# Patient Record
Sex: MALE | Race: OTHER
[De-identification: names, ages, dates, MRNs, and addresses within clinical notes are randomized per-mention and may not be internally consistent; named-entity substitution may affect disease eponyms.]

---

## 2020-07-22 ENCOUNTER — HOSPITAL ENCOUNTER (INPATIENT)
Dept: HOSPITAL 15 - ER | Age: 60
LOS: 6 days | Discharge: HOME | DRG: 871 | End: 2020-07-28
Attending: INTERNAL MEDICINE | Admitting: NURSE PRACTITIONER
Payer: MEDICARE

## 2020-07-22 VITALS — BODY MASS INDEX: 33.94 KG/M2 | HEIGHT: 65 IN | WEIGHT: 203.71 LBS

## 2020-07-22 DIAGNOSIS — N18.3: ICD-10-CM

## 2020-07-22 DIAGNOSIS — Z79.84: ICD-10-CM

## 2020-07-22 DIAGNOSIS — M77.9: ICD-10-CM

## 2020-07-22 DIAGNOSIS — K76.0: ICD-10-CM

## 2020-07-22 DIAGNOSIS — K80.20: ICD-10-CM

## 2020-07-22 DIAGNOSIS — K21.9: ICD-10-CM

## 2020-07-22 DIAGNOSIS — A04.9: ICD-10-CM

## 2020-07-22 DIAGNOSIS — E66.9: ICD-10-CM

## 2020-07-22 DIAGNOSIS — E87.1: ICD-10-CM

## 2020-07-22 DIAGNOSIS — K44.9: ICD-10-CM

## 2020-07-22 DIAGNOSIS — I21.A1: ICD-10-CM

## 2020-07-22 DIAGNOSIS — M48.10: ICD-10-CM

## 2020-07-22 DIAGNOSIS — E11.22: ICD-10-CM

## 2020-07-22 DIAGNOSIS — Z20.828: ICD-10-CM

## 2020-07-22 DIAGNOSIS — N28.1: ICD-10-CM

## 2020-07-22 DIAGNOSIS — Z93.1: ICD-10-CM

## 2020-07-22 DIAGNOSIS — A41.9: Primary | ICD-10-CM

## 2020-07-22 DIAGNOSIS — R19.7: ICD-10-CM

## 2020-07-22 DIAGNOSIS — N28.89: ICD-10-CM

## 2020-07-22 DIAGNOSIS — K29.70: ICD-10-CM

## 2020-07-22 DIAGNOSIS — E78.5: ICD-10-CM

## 2020-07-22 DIAGNOSIS — Z86.73: ICD-10-CM

## 2020-07-22 DIAGNOSIS — R65.20: ICD-10-CM

## 2020-07-22 DIAGNOSIS — N17.0: ICD-10-CM

## 2020-07-22 DIAGNOSIS — I12.9: ICD-10-CM

## 2020-07-22 LAB
ALBUMIN SERPL-MCNC: 3.9 G/DL (ref 3.4–5)
ALP SERPL-CCNC: 81 U/L (ref 45–117)
ALT SERPL-CCNC: 77 U/L (ref 16–61)
AMYLASE SERPL-CCNC: 124 U/L (ref 25–115)
ANION GAP SERPL CALCULATED.3IONS-SCNC: 16 MMOL/L (ref 5–15)
BILIRUB SERPL-MCNC: 1.6 MG/DL (ref 0.2–1)
BUN SERPL-MCNC: 15 MG/DL (ref 7–18)
BUN/CREAT SERPL: 8.8
CALCIUM SERPL-MCNC: 8.5 MG/DL (ref 8.5–10.1)
CHLORIDE SERPL-SCNC: 86 MMOL/L (ref 98–107)
CO2 SERPL-SCNC: 13 MMOL/L (ref 21–32)
GLUCOSE SERPL-MCNC: 124 MG/DL (ref 74–106)
HCT VFR BLD AUTO: 44.6 % (ref 41–53)
HGB BLD-MCNC: 15.2 G/DL (ref 13.5–17.5)
LACTATE PLASV-SCNC: 2.3 MMOL/L (ref 0.4–2)
LIPASE SERPL-CCNC: 212 U/L (ref 73–393)
MCH RBC QN AUTO: 29.9 PG (ref 28–32)
MCV RBC AUTO: 87.5 FL (ref 80–100)
NRBC BLD QL AUTO: 0 %
POTASSIUM SERPL-SCNC: 3.9 MMOL/L (ref 3.5–5.1)
PROT SERPL-MCNC: 8.3 G/DL (ref 6.4–8.2)
SODIUM SERPL-SCNC: 115 MMOL/L (ref 136–145)

## 2020-07-22 PROCEDURE — 82728 ASSAY OF FERRITIN: CPT

## 2020-07-22 PROCEDURE — 83970 ASSAY OF PARATHORMONE: CPT

## 2020-07-22 PROCEDURE — 80053 COMPREHEN METABOLIC PANEL: CPT

## 2020-07-22 PROCEDURE — 82306 VITAMIN D 25 HYDROXY: CPT

## 2020-07-22 PROCEDURE — 84484 ASSAY OF TROPONIN QUANT: CPT

## 2020-07-22 PROCEDURE — 81001 URINALYSIS AUTO W/SCOPE: CPT

## 2020-07-22 PROCEDURE — 76705 ECHO EXAM OF ABDOMEN: CPT

## 2020-07-22 PROCEDURE — 82962 GLUCOSE BLOOD TEST: CPT

## 2020-07-22 PROCEDURE — 82570 ASSAY OF URINE CREATININE: CPT

## 2020-07-22 PROCEDURE — 84300 ASSAY OF URINE SODIUM: CPT

## 2020-07-22 PROCEDURE — 84100 ASSAY OF PHOSPHORUS: CPT

## 2020-07-22 PROCEDURE — 96367 TX/PROPH/DG ADDL SEQ IV INF: CPT

## 2020-07-22 PROCEDURE — 87493 C DIFF AMPLIFIED PROBE: CPT

## 2020-07-22 PROCEDURE — 83880 ASSAY OF NATRIURETIC PEPTIDE: CPT

## 2020-07-22 PROCEDURE — 96361 HYDRATE IV INFUSION ADD-ON: CPT

## 2020-07-22 PROCEDURE — 85610 PROTHROMBIN TIME: CPT

## 2020-07-22 PROCEDURE — 83036 HEMOGLOBIN GLYCOSYLATED A1C: CPT

## 2020-07-22 PROCEDURE — 74176 CT ABD & PELVIS W/O CONTRAST: CPT

## 2020-07-22 PROCEDURE — 96365 THER/PROPH/DIAG IV INF INIT: CPT

## 2020-07-22 PROCEDURE — 71045 X-RAY EXAM CHEST 1 VIEW: CPT

## 2020-07-22 PROCEDURE — 87040 BLOOD CULTURE FOR BACTERIA: CPT

## 2020-07-22 PROCEDURE — 85730 THROMBOPLASTIN TIME PARTIAL: CPT

## 2020-07-22 PROCEDURE — 82150 ASSAY OF AMYLASE: CPT

## 2020-07-22 PROCEDURE — 85025 COMPLETE CBC W/AUTO DIFF WBC: CPT

## 2020-07-22 PROCEDURE — 36415 COLL VENOUS BLD VENIPUNCTURE: CPT

## 2020-07-22 PROCEDURE — 83615 LACTATE (LD) (LDH) ENZYME: CPT

## 2020-07-22 PROCEDURE — 43239 EGD BIOPSY SINGLE/MULTIPLE: CPT

## 2020-07-22 PROCEDURE — 83690 ASSAY OF LIPASE: CPT

## 2020-07-22 PROCEDURE — 93005 ELECTROCARDIOGRAM TRACING: CPT

## 2020-07-22 PROCEDURE — 80048 BASIC METABOLIC PNL TOTAL CA: CPT

## 2020-07-22 PROCEDURE — 87086 URINE CULTURE/COLONY COUNT: CPT

## 2020-07-22 PROCEDURE — 83605 ASSAY OF LACTIC ACID: CPT

## 2020-07-22 PROCEDURE — 86141 C-REACTIVE PROTEIN HS: CPT

## 2020-07-22 PROCEDURE — 93306 TTE W/DOPPLER COMPLETE: CPT

## 2020-07-22 PROCEDURE — 84295 ASSAY OF SERUM SODIUM: CPT

## 2020-07-22 PROCEDURE — 76775 US EXAM ABDO BACK WALL LIM: CPT

## 2020-07-22 PROCEDURE — 96375 TX/PRO/DX INJ NEW DRUG ADDON: CPT

## 2020-07-22 PROCEDURE — 96372 THER/PROPH/DIAG INJ SC/IM: CPT

## 2020-07-22 PROCEDURE — 78226 HEPATOBILIARY SYSTEM IMAGING: CPT

## 2020-07-23 VITALS — DIASTOLIC BLOOD PRESSURE: 74 MMHG | SYSTOLIC BLOOD PRESSURE: 142 MMHG

## 2020-07-23 VITALS — SYSTOLIC BLOOD PRESSURE: 103 MMHG | DIASTOLIC BLOOD PRESSURE: 60 MMHG

## 2020-07-23 LAB
ALBUMIN SERPL-MCNC: 3.6 G/DL (ref 3.4–5)
ALP SERPL-CCNC: 72 U/L (ref 45–117)
ALT SERPL-CCNC: 108 U/L (ref 16–61)
ANION GAP SERPL CALCULATED.3IONS-SCNC: 12 MMOL/L (ref 5–15)
ANION GAP SERPL CALCULATED.3IONS-SCNC: 14 MMOL/L (ref 5–15)
APTT PPP: 27.6 SEC (ref 23.64–32.05)
BILIRUB SERPL-MCNC: 1.6 MG/DL (ref 0.2–1)
BUN SERPL-MCNC: 23 MG/DL (ref 7–18)
BUN SERPL-MCNC: 27 MG/DL (ref 7–18)
BUN/CREAT SERPL: 11.5
BUN/CREAT SERPL: 11.9
CALCIUM SERPL-MCNC: 8.6 MG/DL (ref 8.5–10.1)
CALCIUM SERPL-MCNC: 8.7 MG/DL (ref 8.5–10.1)
CHLORIDE SERPL-SCNC: 103 MMOL/L (ref 98–107)
CHLORIDE SERPL-SCNC: 111 MMOL/L (ref 98–107)
CO2 SERPL-SCNC: 13 MMOL/L (ref 21–32)
CO2 SERPL-SCNC: 14 MMOL/L (ref 21–32)
GLUCOSE SERPL-MCNC: 103 MG/DL (ref 74–106)
GLUCOSE SERPL-MCNC: 112 MG/DL (ref 74–106)
INR PPP: 1.08 (ref 0.9–1.15)
POTASSIUM SERPL-SCNC: 4.1 MMOL/L (ref 3.5–5.1)
POTASSIUM SERPL-SCNC: 4.3 MMOL/L (ref 3.5–5.1)
PROT SERPL-MCNC: 7.9 G/DL (ref 6.4–8.2)
SODIUM SERPL-SCNC: 130 MMOL/L (ref 136–145)
SODIUM SERPL-SCNC: 137 MMOL/L (ref 136–145)

## 2020-07-23 RX ADMIN — HUMAN INSULIN SCH UNITS: 100 INJECTION, SOLUTION SUBCUTANEOUS at 12:43

## 2020-07-23 RX ADMIN — Medication SCH STRIP: at 05:43

## 2020-07-23 RX ADMIN — Medication SCH STRIP: at 18:00

## 2020-07-23 RX ADMIN — HUMAN INSULIN SCH UNITS: 100 INJECTION, SOLUTION SUBCUTANEOUS at 18:00

## 2020-07-23 RX ADMIN — Medication SCH STRIP: at 12:43

## 2020-07-23 RX ADMIN — PANTOPRAZOLE SODIUM SCH MG: 40 INJECTION, POWDER, FOR SOLUTION INTRAVENOUS at 12:24

## 2020-07-23 RX ADMIN — ATORVASTATIN CALCIUM SCH MG: 20 TABLET, FILM COATED ORAL at 22:30

## 2020-07-23 RX ADMIN — PANTOPRAZOLE SODIUM SCH MG: 40 INJECTION, POWDER, FOR SOLUTION INTRAVENOUS at 22:29

## 2020-07-23 RX ADMIN — HUMAN INSULIN SCH UNITS: 100 INJECTION, SOLUTION SUBCUTANEOUS at 05:45

## 2020-07-23 RX ADMIN — HUMAN INSULIN SCH UNITS: 100 INJECTION, SOLUTION SUBCUTANEOUS at 23:56

## 2020-07-23 RX ADMIN — Medication SCH STRIP: at 23:55

## 2020-07-24 VITALS — DIASTOLIC BLOOD PRESSURE: 81 MMHG | SYSTOLIC BLOOD PRESSURE: 135 MMHG

## 2020-07-24 VITALS — SYSTOLIC BLOOD PRESSURE: 115 MMHG | DIASTOLIC BLOOD PRESSURE: 72 MMHG

## 2020-07-24 VITALS — SYSTOLIC BLOOD PRESSURE: 131 MMHG | DIASTOLIC BLOOD PRESSURE: 82 MMHG

## 2020-07-24 VITALS — SYSTOLIC BLOOD PRESSURE: 134 MMHG | DIASTOLIC BLOOD PRESSURE: 75 MMHG

## 2020-07-24 VITALS — DIASTOLIC BLOOD PRESSURE: 72 MMHG | SYSTOLIC BLOOD PRESSURE: 115 MMHG

## 2020-07-24 VITALS — DIASTOLIC BLOOD PRESSURE: 79 MMHG | SYSTOLIC BLOOD PRESSURE: 125 MMHG

## 2020-07-24 LAB
ALBUMIN SERPL-MCNC: 3.5 G/DL (ref 3.4–5)
ALP SERPL-CCNC: 73 U/L (ref 45–117)
ALT SERPL-CCNC: 112 U/L (ref 16–61)
ANION GAP SERPL CALCULATED.3IONS-SCNC: 13 MMOL/L (ref 5–15)
BILIRUB SERPL-MCNC: 0.8 MG/DL (ref 0.2–1)
BUN SERPL-MCNC: 38 MG/DL (ref 7–18)
BUN/CREAT SERPL: 11.7
CALCIUM SERPL-MCNC: 9.1 MG/DL (ref 8.5–10.1)
CHLORIDE SERPL-SCNC: 110 MMOL/L (ref 98–107)
CO2 SERPL-SCNC: 15 MMOL/L (ref 21–32)
GLUCOSE SERPL-MCNC: 99 MG/DL (ref 74–106)
HCT VFR BLD AUTO: 47.5 % (ref 41–53)
HGB BLD-MCNC: 16.2 G/DL (ref 13.5–17.5)
MCH RBC QN AUTO: 30.7 PG (ref 28–32)
MCV RBC AUTO: 89.9 FL (ref 80–100)
NRBC BLD QL AUTO: 0 %
POTASSIUM SERPL-SCNC: 4.6 MMOL/L (ref 3.5–5.1)
PROT SERPL-MCNC: 7.9 G/DL (ref 6.4–8.2)
SODIUM SERPL-SCNC: 138 MMOL/L (ref 136–145)

## 2020-07-24 RX ADMIN — HUMAN INSULIN SCH UNITS: 100 INJECTION, SOLUTION SUBCUTANEOUS at 05:33

## 2020-07-24 RX ADMIN — PANTOPRAZOLE SODIUM SCH MG: 40 INJECTION, POWDER, FOR SOLUTION INTRAVENOUS at 21:52

## 2020-07-24 RX ADMIN — Medication SCH STRIP: at 05:33

## 2020-07-24 RX ADMIN — DEXTROSE AND SODIUM CHLORIDE SCH MLS/HR: 5; 450 INJECTION, SOLUTION INTRAVENOUS at 14:46

## 2020-07-24 RX ADMIN — HUMAN INSULIN SCH UNITS: 100 INJECTION, SOLUTION SUBCUTANEOUS at 18:00

## 2020-07-24 RX ADMIN — HUMAN INSULIN SCH UNITS: 100 INJECTION, SOLUTION SUBCUTANEOUS at 12:00

## 2020-07-24 RX ADMIN — HUMAN INSULIN SCH UNITS: 100 INJECTION, SOLUTION SUBCUTANEOUS at 23:34

## 2020-07-24 RX ADMIN — Medication SCH STRIP: at 23:34

## 2020-07-24 RX ADMIN — Medication SCH STRIP: at 18:34

## 2020-07-24 RX ADMIN — ATORVASTATIN CALCIUM SCH MG: 20 TABLET, FILM COATED ORAL at 21:52

## 2020-07-24 RX ADMIN — PANTOPRAZOLE SODIUM SCH MG: 40 INJECTION, POWDER, FOR SOLUTION INTRAVENOUS at 11:03

## 2020-07-24 RX ADMIN — Medication SCH STRIP: at 12:00

## 2020-07-24 NOTE — NUR
Opening Shift Note

Received report from Patti PARIS. Assumed care of patient, awake and alert.  No S/S of 
distress/SOB or pain.  Instructed on POC and to call for assist PRN. Fall precaution 
measures in place, will continue to monitor for changes Q1hr and PRN.

## 2020-07-24 NOTE — NUR
Opening Shift Note

Assumed care of patient, awake and alert x4 .  No S/S of distress/SOB or pain.  Instructed 
on POC and to call for assist PRN, Bed at lowest locked position and call light within 
reach. Will continue to monitor for changes Q1hr and PRN.

## 2020-07-24 NOTE — NUR
Steiner Catheter 

Patient is c/o Steiner catheter leaking, per VIRY Gannon. Patient's Steiner catheter has drained 
a total of 820cc of yellow urine. No c/o pain. Will endorse care to NOC RN Griselda

## 2020-07-24 NOTE — NUR
Eduardo catheter insertion

Patient assessed and determined to be in need of eduardo catheter. Order obtained from  MD. 
Patient educated on catheter and reason for insertion. All questions answered. Eduardo 
catheter 16 guage Cambodian inserted with clean sterile technique. Patient tolerated well.

## 2020-07-25 VITALS — DIASTOLIC BLOOD PRESSURE: 88 MMHG | SYSTOLIC BLOOD PRESSURE: 127 MMHG

## 2020-07-25 VITALS — DIASTOLIC BLOOD PRESSURE: 86 MMHG | SYSTOLIC BLOOD PRESSURE: 138 MMHG

## 2020-07-25 VITALS — DIASTOLIC BLOOD PRESSURE: 83 MMHG | SYSTOLIC BLOOD PRESSURE: 139 MMHG

## 2020-07-25 VITALS — SYSTOLIC BLOOD PRESSURE: 132 MMHG

## 2020-07-25 VITALS — DIASTOLIC BLOOD PRESSURE: 88 MMHG | SYSTOLIC BLOOD PRESSURE: 134 MMHG

## 2020-07-25 LAB
ANION GAP SERPL CALCULATED.3IONS-SCNC: 10 MMOL/L (ref 5–15)
BUN SERPL-MCNC: 49 MG/DL (ref 7–18)
BUN/CREAT SERPL: 12.8
CALCIUM SERPL-MCNC: 9.3 MG/DL (ref 8.5–10.1)
CHLORIDE SERPL-SCNC: 110 MMOL/L (ref 98–107)
CO2 SERPL-SCNC: 19 MMOL/L (ref 21–32)
GLUCOSE SERPL-MCNC: 124 MG/DL (ref 74–106)
HCT VFR BLD AUTO: 46.9 % (ref 41–53)
HGB BLD-MCNC: 16 G/DL (ref 13.5–17.5)
MCH RBC QN AUTO: 30.3 PG (ref 28–32)
MCV RBC AUTO: 89 FL (ref 80–100)
NRBC BLD QL AUTO: 0.7 %
POTASSIUM SERPL-SCNC: 4.2 MMOL/L (ref 3.5–5.1)
SODIUM SERPL-SCNC: 139 MMOL/L (ref 136–145)

## 2020-07-25 PROCEDURE — 0DB68ZX EXCISION OF STOMACH, VIA NATURAL OR ARTIFICIAL OPENING ENDOSCOPIC, DIAGNOSTIC: ICD-10-PCS | Performed by: INTERNAL MEDICINE

## 2020-07-25 RX ADMIN — Medication SCH STRIP: at 06:11

## 2020-07-25 RX ADMIN — HUMAN INSULIN SCH UNITS: 100 INJECTION, SOLUTION SUBCUTANEOUS at 17:46

## 2020-07-25 RX ADMIN — DEXTROSE AND SODIUM CHLORIDE SCH MLS/HR: 5; 450 INJECTION, SOLUTION INTRAVENOUS at 14:41

## 2020-07-25 RX ADMIN — PANTOPRAZOLE SODIUM SCH MG: 40 TABLET, DELAYED RELEASE ORAL at 22:00

## 2020-07-25 RX ADMIN — HUMAN INSULIN SCH UNITS: 100 INJECTION, SOLUTION SUBCUTANEOUS at 12:00

## 2020-07-25 RX ADMIN — Medication SCH STRIP: at 12:42

## 2020-07-25 RX ADMIN — DEXTROSE AND SODIUM CHLORIDE SCH MLS/HR: 5; 450 INJECTION, SOLUTION INTRAVENOUS at 04:10

## 2020-07-25 RX ADMIN — Medication SCH STRIP: at 17:47

## 2020-07-25 RX ADMIN — ATORVASTATIN CALCIUM SCH MG: 20 TABLET, FILM COATED ORAL at 22:00

## 2020-07-25 RX ADMIN — PANTOPRAZOLE SODIUM SCH MG: 40 INJECTION, POWDER, FOR SOLUTION INTRAVENOUS at 10:15

## 2020-07-25 RX ADMIN — HUMAN INSULIN SCH UNITS: 100 INJECTION, SOLUTION SUBCUTANEOUS at 06:12

## 2020-07-25 NOTE — NUR
PT BACK FROM GI LAB. PT IN LOW FOWLERS, DENIES DISCOMFORT. BED LOCKED LAURA IN LOWEST 
POSITION, CALL LIGHT WITHIN REACH.

## 2020-07-25 NOTE — NUR
Steiner catheter re-insertion

Steiner catheter 18 gauge Czech inserted with clean sterile technique. Patient tolerated 
well. Noted to have a small amount of blood in the urine, will continue to monitor.

## 2020-07-25 NOTE — NUR
Opening Shift Note

Assumed care of patient, awake and alert.  No S/S of distress/SOB or pain.  Instructed on 
POC and to call for assist PRN, will continue to monitor for changes Q1hr and PRN. Bed in 
low position. Call light in reach

## 2020-07-25 NOTE — NUR
Eduardo catheter dc'd

Order to discontinue eduardo catheter.  Eduardo dc'd  with clean technique following deflation 
of balloon. Patient tolerated well with no complaints of pain. Continue care.

-------------------------------------------------------------------------------

Addendum: 07/25/20 at 0418 by Jarek Johnson RN

-------------------------------------------------------------------------------

Wrong entry

## 2020-07-25 NOTE — NUR
Nutrition Assessment Notes



please see attached link for complete assessment



Est energy needs ABW 72 [id number] kcal (23-25kcal/kg ABW), Est protein 57-72g 
(0.8-1.05g/kg ABW). Will reassess prn. 




-------------------------------------------------------------------------------

Addendum: 20 at 1105 by Elvira Sanderson RD

-------------------------------------------------------------------------------

Amended: Links added.

## 2020-07-26 VITALS — SYSTOLIC BLOOD PRESSURE: 118 MMHG | DIASTOLIC BLOOD PRESSURE: 77 MMHG

## 2020-07-26 VITALS — SYSTOLIC BLOOD PRESSURE: 116 MMHG | DIASTOLIC BLOOD PRESSURE: 74 MMHG

## 2020-07-26 VITALS — DIASTOLIC BLOOD PRESSURE: 87 MMHG | SYSTOLIC BLOOD PRESSURE: 123 MMHG

## 2020-07-26 VITALS — DIASTOLIC BLOOD PRESSURE: 69 MMHG | SYSTOLIC BLOOD PRESSURE: 114 MMHG

## 2020-07-26 VITALS — SYSTOLIC BLOOD PRESSURE: 145 MMHG | DIASTOLIC BLOOD PRESSURE: 89 MMHG

## 2020-07-26 LAB
ALBUMIN SERPL-MCNC: 3.3 G/DL (ref 3.4–5)
ALP SERPL-CCNC: 66 U/L (ref 45–117)
ALT SERPL-CCNC: 70 U/L (ref 16–61)
ANION GAP SERPL CALCULATED.3IONS-SCNC: 9 MMOL/L (ref 5–15)
BILIRUB SERPL-MCNC: 0.6 MG/DL (ref 0.2–1)
BUN SERPL-MCNC: 60 MG/DL (ref 7–18)
BUN/CREAT SERPL: 15.1
CALCIUM SERPL-MCNC: 9.1 MG/DL (ref 8.5–10.1)
CHLORIDE SERPL-SCNC: 107 MMOL/L (ref 98–107)
CO2 SERPL-SCNC: 21 MMOL/L (ref 21–32)
GLUCOSE SERPL-MCNC: 146 MG/DL (ref 74–106)
POTASSIUM SERPL-SCNC: 3.7 MMOL/L (ref 3.5–5.1)
PROT SERPL-MCNC: 7.6 G/DL (ref 6.4–8.2)
SODIUM SERPL-SCNC: 137 MMOL/L (ref 136–145)

## 2020-07-26 RX ADMIN — SODIUM CHLORIDE SCH MLS/HR: 0.9 INJECTION, SOLUTION INTRAVENOUS at 16:08

## 2020-07-26 RX ADMIN — HUMAN INSULIN SCH UNITS: 100 INJECTION, SOLUTION SUBCUTANEOUS at 17:52

## 2020-07-26 RX ADMIN — Medication SCH STRIP: at 13:13

## 2020-07-26 RX ADMIN — Medication SCH STRIP: at 00:00

## 2020-07-26 RX ADMIN — Medication SCH STRIP: at 17:52

## 2020-07-26 RX ADMIN — HUMAN INSULIN SCH UNITS: 100 INJECTION, SOLUTION SUBCUTANEOUS at 13:12

## 2020-07-26 RX ADMIN — SIMETHICONE CHEW TAB 80 MG SCH MG: 80 TABLET ORAL at 18:35

## 2020-07-26 RX ADMIN — PANTOPRAZOLE SODIUM SCH MG: 40 TABLET, DELAYED RELEASE ORAL at 21:46

## 2020-07-26 RX ADMIN — HUMAN INSULIN SCH UNITS: 100 INJECTION, SOLUTION SUBCUTANEOUS at 06:00

## 2020-07-26 RX ADMIN — Medication SCH STRIP: at 06:00

## 2020-07-26 RX ADMIN — PANTOPRAZOLE SODIUM SCH MG: 40 TABLET, DELAYED RELEASE ORAL at 10:08

## 2020-07-26 RX ADMIN — DEXTROSE AND SODIUM CHLORIDE SCH MLS/HR: 5; 450 INJECTION, SOLUTION INTRAVENOUS at 05:15

## 2020-07-26 RX ADMIN — SIMETHICONE CHEW TAB 80 MG SCH MG: 80 TABLET ORAL at 21:48

## 2020-07-26 RX ADMIN — ATORVASTATIN CALCIUM SCH MG: 20 TABLET, FILM COATED ORAL at 21:46

## 2020-07-26 RX ADMIN — HUMAN INSULIN SCH UNITS: 100 INJECTION, SOLUTION SUBCUTANEOUS at 00:00

## 2020-07-26 NOTE — NUR
IV CATHETERS LEAKING TO LAC#18 AND LW#20 CATHETERS DC'D, NO PHLEBITIS, CATHETER INTACT. 

IV ACCESS INSERTION TO RIGHT HAND #20, FLUSHES WELL. RESUMED IV FLUIDS PER ORDER. 

PT TOLERATED PROCEDURE WELL. 

BED LOCKED AND IN LOWEST POSITION, CALL LIGHT WITHIN REACH.

## 2020-07-26 NOTE — NUR
Opening Shift Note

Assumed care of patient, awake and alert.  No S/S of distress/SOB or pain.  Instructed on 
POC and to call for assist PRN, will continue to monitor for changes Q1hr and PRN. Bed 
locked in lowest position, side rails up x 2, HOB elevated at least 30 degrees, and call 
light is within reach.

## 2020-07-27 VITALS — SYSTOLIC BLOOD PRESSURE: 144 MMHG | DIASTOLIC BLOOD PRESSURE: 79 MMHG

## 2020-07-27 VITALS — DIASTOLIC BLOOD PRESSURE: 90 MMHG | SYSTOLIC BLOOD PRESSURE: 150 MMHG

## 2020-07-27 VITALS — SYSTOLIC BLOOD PRESSURE: 138 MMHG | DIASTOLIC BLOOD PRESSURE: 81 MMHG

## 2020-07-27 VITALS — SYSTOLIC BLOOD PRESSURE: 129 MMHG | DIASTOLIC BLOOD PRESSURE: 46 MMHG

## 2020-07-27 VITALS — DIASTOLIC BLOOD PRESSURE: 46 MMHG | SYSTOLIC BLOOD PRESSURE: 129 MMHG

## 2020-07-27 VITALS — SYSTOLIC BLOOD PRESSURE: 144 MMHG | DIASTOLIC BLOOD PRESSURE: 88 MMHG

## 2020-07-27 LAB
ANION GAP SERPL CALCULATED.3IONS-SCNC: 7 MMOL/L (ref 5–15)
BUN SERPL-MCNC: 58 MG/DL (ref 7–18)
BUN/CREAT SERPL: 16.8
CALCIUM SERPL-MCNC: 8.1 MG/DL (ref 8.5–10.1)
CHLORIDE SERPL-SCNC: 106 MMOL/L (ref 98–107)
CO2 SERPL-SCNC: 22 MMOL/L (ref 21–32)
GLUCOSE SERPL-MCNC: 121 MG/DL (ref 74–106)
HCT VFR BLD AUTO: 39.7 % (ref 41–53)
HGB BLD-MCNC: 13.7 G/DL (ref 13.5–17.5)
MCH RBC QN AUTO: 30.9 PG (ref 28–32)
MCV RBC AUTO: 89.8 FL (ref 80–100)
NRBC BLD QL AUTO: 0 %
POTASSIUM SERPL-SCNC: 3.7 MMOL/L (ref 3.5–5.1)
SODIUM SERPL-SCNC: 135 MMOL/L (ref 136–145)

## 2020-07-27 RX ADMIN — Medication SCH STRIP: at 12:00

## 2020-07-27 RX ADMIN — HUMAN INSULIN SCH UNITS: 100 INJECTION, SOLUTION SUBCUTANEOUS at 18:23

## 2020-07-27 RX ADMIN — PANTOPRAZOLE SODIUM SCH MG: 40 TABLET, DELAYED RELEASE ORAL at 22:43

## 2020-07-27 RX ADMIN — HUMAN INSULIN SCH UNITS: 100 INJECTION, SOLUTION SUBCUTANEOUS at 05:42

## 2020-07-27 RX ADMIN — SIMETHICONE CHEW TAB 80 MG SCH MG: 80 TABLET ORAL at 05:14

## 2020-07-27 RX ADMIN — HUMAN INSULIN SCH UNITS: 100 INJECTION, SOLUTION SUBCUTANEOUS at 12:00

## 2020-07-27 RX ADMIN — PANTOPRAZOLE SODIUM SCH MG: 40 TABLET, DELAYED RELEASE ORAL at 10:11

## 2020-07-27 RX ADMIN — Medication SCH STRIP: at 00:25

## 2020-07-27 RX ADMIN — SIMETHICONE CHEW TAB 80 MG SCH MG: 80 TABLET ORAL at 13:50

## 2020-07-27 RX ADMIN — Medication SCH STRIP: at 05:42

## 2020-07-27 RX ADMIN — CIPROFLOXACIN HYDROCHLORIDE SCH MG: 500 TABLET, FILM COATED ORAL at 22:42

## 2020-07-27 RX ADMIN — ATORVASTATIN CALCIUM SCH MG: 20 TABLET, FILM COATED ORAL at 22:43

## 2020-07-27 RX ADMIN — SODIUM CHLORIDE SCH MLS/HR: 0.9 INJECTION, SOLUTION INTRAVENOUS at 17:22

## 2020-07-27 RX ADMIN — SIMETHICONE CHEW TAB 80 MG SCH MG: 80 TABLET ORAL at 17:23

## 2020-07-27 RX ADMIN — Medication SCH STRIP: at 18:24

## 2020-07-27 RX ADMIN — SODIUM CHLORIDE SCH MLS/HR: 0.9 INJECTION, SOLUTION INTRAVENOUS at 04:05

## 2020-07-27 RX ADMIN — HUMAN INSULIN SCH UNITS: 100 INJECTION, SOLUTION SUBCUTANEOUS at 00:26

## 2020-07-27 RX ADMIN — SIMETHICONE CHEW TAB 80 MG SCH MG: 80 TABLET ORAL at 22:43

## 2020-07-27 NOTE — NUR
RECEIVED PATIENT ALERT AND ORIENTED X4, NOT IN DISTRESS, CLEAR LUNG SOUNDS IN BILATERAL 
LOBES, RR=16 SAT=96%, DEEP BREATHING AND COUGHING WAS ENCOURAGED, DEMONSTRATED AND 
VERBALIZED WELL, DENIED SOB AND CHEST PAIN AT THIS MOMENT, SR R= 72 ON TELE MONITOR, ABDOMEN 
SOFT AND ACTIVE BS IN FOUR QUADRANTS, TOLERATED 100% OF PROVIDED BREAK FAST TRAY, LAST 
BM=7/26/20 AS REPORTED, CRUZ CATH IN PLACE AND PATENT, DRAINING CLEAR YELLOW URINE, 1250CC 
URINE OUT PUT NOTED, SKIN INTACT WARM TO TOUCH, RADIAL AND PEDAL PULSES PALPABLE, DENIED 
PAIN RESTING ON BED, HEAD OF BED ELEVATED, BED ON LOW POSITION, RAILS UP X2, CALL LIGHT ON 
REACH, WILL CONTINUE MONITORING.

## 2020-07-27 NOTE — NUR
Nutrition Followup Notes



Pt wt is 91.0 kg



Pt is with a CCHO 60g diet, appetite is good aeb 100% PO intake over two meals per RN doc. 
Will continue to monitor PO status, skin status, pertinent labs and weight trends. Will f/u 
in 3-5 days.



Est energy needs ABW 72 [id number] kcal (23-25kcal/kg ABW), 

Est protein 57-72g (0.8-1.05g/kg ABW). 

Will reassess prn. 



LABS:  BUN 58 H, CREAT 3.46 H, GFR 19 L, POC  H, CA 8.1 L, ALB 3.3 L



GI: Pt had 2 BM on  per RN doc



BS:  20 low risk.  Refer to wound assessment report for full details.



PES:

Decreased nutrient needs r/t adiposity aeb pt`s high BMI of 30.9 kgm2



Comments 

1) advance diet as medically feasible

2) refer to CDE on DC

3) continue current plan of care

## 2020-07-27 NOTE — NUR
RESTING ON BED, NOT IN DISTRESS, DENIED PAIN, STOOL SAMPLE FOR C DIFF WAS SENT TO THE LAB AS 
ORDERED, WILL CONTINUE MONITORING.

## 2020-07-27 NOTE — NUR
OPENING SHIFT NOTE:

Assumed care of patient. Patient awake, alert and oriented x 4. No s/s of SOB or distress, 
patient denies pain. Bed in lowest locked position with two side rails raised and call bell 
within reach. Instructed on POC and encouraged to call for assistance, all questions and 
concerns addressed, patient verbalizes understanding. Steiner hung below the bladder and 
draining to gravity. Will continue to monitor Q1 hr and PRN.

## 2020-07-27 NOTE — NUR
OUT OF BED X4, TOLERATED WELL, LIGHT BROWN WATERY BM X4 AS REPORTED, DR. BRENNON GORDILLO 
PAGED FOR UPDATE AND FOLLOW UP, WAITING FOR CALL BACK, WILL CONTINUE MONITORING.

## 2020-07-28 VITALS — SYSTOLIC BLOOD PRESSURE: 134 MMHG | DIASTOLIC BLOOD PRESSURE: 90 MMHG

## 2020-07-28 VITALS — SYSTOLIC BLOOD PRESSURE: 150 MMHG | DIASTOLIC BLOOD PRESSURE: 87 MMHG

## 2020-07-28 VITALS — SYSTOLIC BLOOD PRESSURE: 123 MMHG | DIASTOLIC BLOOD PRESSURE: 84 MMHG

## 2020-07-28 VITALS — DIASTOLIC BLOOD PRESSURE: 77 MMHG | SYSTOLIC BLOOD PRESSURE: 128 MMHG

## 2020-07-28 VITALS — DIASTOLIC BLOOD PRESSURE: 74 MMHG | SYSTOLIC BLOOD PRESSURE: 130 MMHG

## 2020-07-28 LAB
ALBUMIN SERPL-MCNC: 2.8 G/DL (ref 3.4–5)
ALP SERPL-CCNC: 50 U/L (ref 45–117)
ALT SERPL-CCNC: 48 U/L (ref 16–61)
ANION GAP SERPL CALCULATED.3IONS-SCNC: 6 MMOL/L (ref 5–15)
BILIRUB SERPL-MCNC: 0.4 MG/DL (ref 0.2–1)
BUN SERPL-MCNC: 50 MG/DL (ref 7–18)
BUN/CREAT SERPL: 15.9
CALCIUM SERPL-MCNC: 8.6 MG/DL (ref 8.5–10.1)
CHLORIDE SERPL-SCNC: 107 MMOL/L (ref 98–107)
CO2 SERPL-SCNC: 24 MMOL/L (ref 21–32)
GLUCOSE SERPL-MCNC: 113 MG/DL (ref 74–106)
POTASSIUM SERPL-SCNC: 4.3 MMOL/L (ref 3.5–5.1)
PROT SERPL-MCNC: 6.4 G/DL (ref 6.4–8.2)
SODIUM SERPL-SCNC: 137 MMOL/L (ref 136–145)

## 2020-07-28 RX ADMIN — HUMAN INSULIN SCH UNITS: 100 INJECTION, SOLUTION SUBCUTANEOUS at 12:16

## 2020-07-28 RX ADMIN — SIMETHICONE CHEW TAB 80 MG SCH MG: 80 TABLET ORAL at 05:12

## 2020-07-28 RX ADMIN — PANTOPRAZOLE SODIUM SCH MG: 40 TABLET, DELAYED RELEASE ORAL at 10:04

## 2020-07-28 RX ADMIN — Medication SCH STRIP: at 12:16

## 2020-07-28 RX ADMIN — HUMAN INSULIN SCH UNITS: 100 INJECTION, SOLUTION SUBCUTANEOUS at 00:15

## 2020-07-28 RX ADMIN — Medication SCH STRIP: at 18:00

## 2020-07-28 RX ADMIN — HUMAN INSULIN SCH UNITS: 100 INJECTION, SOLUTION SUBCUTANEOUS at 18:00

## 2020-07-28 RX ADMIN — SIMETHICONE CHEW TAB 80 MG SCH MG: 80 TABLET ORAL at 12:10

## 2020-07-28 RX ADMIN — HUMAN INSULIN SCH UNITS: 100 INJECTION, SOLUTION SUBCUTANEOUS at 05:09

## 2020-07-28 RX ADMIN — SODIUM CHLORIDE SCH MLS/HR: 0.9 INJECTION, SOLUTION INTRAVENOUS at 06:17

## 2020-07-28 RX ADMIN — SIMETHICONE CHEW TAB 80 MG SCH MG: 80 TABLET ORAL at 18:01

## 2020-07-28 RX ADMIN — Medication SCH STRIP: at 00:14

## 2020-07-28 RX ADMIN — Medication SCH STRIP: at 05:09

## 2020-07-28 RX ADMIN — CIPROFLOXACIN HYDROCHLORIDE SCH MG: 500 TABLET, FILM COATED ORAL at 10:04

## 2020-07-28 NOTE — NUR
Discharge instructions given as ordered. Encourage to follow up with PMD as instructed. All 
questions and concerns addressed. Patient verbalized understanding. Medication 
reconciliation form completed and copy given to patient. IV removed with catheter intact, 
pressure dressing applied, eduardo catheter removed. Telemetry unit returned to ICU. Patient 
taken to vehicle via wheelchair without incident with all personal belongings to care of 
family. No distress noted at time of departure.

## 2020-07-28 NOTE — NUR
Lab contacted for pending C.Diff result, stool sample is on process as reported by 
microbiology, resting on bed, not in distress, denied pain will continue monitoring.

## 2020-07-28 NOTE — NUR
ASSUMED CARE OF PATIENT. PATIENT LYING IN BED RESTING COMFORTABLY, NO S/S OF DISTRESS. WILL 
CONTINUE TO MONITOR.

## 2020-07-28 NOTE — NUR
D/C INSTRUCTIONS AND EDUCATION FORMES COMPLETED, FOLLOW UP APPOINTMENT EXISTING AND DONE BY 
THE PATIENT, REPORT WAS GIVEN TO THE NIGHT SHIFT RN AND  D/C PAPERS HANDED.

## 2020-07-28 NOTE — NUR
PENDING D/C, DAUGHTER ALLAN WAS CONTACTED  [id number] FOR D/C TRANSPORTATION 
ARRANGEMENT, FAMILY WILL  PATIENT AT 2000 AS REPORTED, TOLERATED DINNER TRAY 100%, 
SITTING ON CHAIR SOCIALIZING WITH BED 2, WILL CONTINUE MONITORING.

## 2020-07-28 NOTE — NUR
RECEIVED PATIENT ALERT AND ORIENTED X4, NOT IN DISTRESS, CLEAR LUNG SOUNDS IN BILATERAL 
LOBES, RR=18 SAT=95%, DEEP BREATHING AND COUGHING WAS ENCOURAGED, DEMONSTRATED AND 
VERBALIZED WELL, DENIED SOB AND CHEST PAIN AT THIS MOMENT, SR R= 68 ON TELE MONITOR, ABDOMEN 
SOFT AND ACTIVE BS IN FOUR QUADRANTS, LAST BM=7/27/20 AS REPORTED, CRUZ CATH IN PLACE AND 
PATENT, DRAINING CLEAR YELLOW URINE, SKIN INTACT WARM TO TOUCH, RADIAL AND PEDAL PULSES 
PALPABLE, DENIED PAIN RESTING ON BED, HEAD OF BED ELEVATED, BED ON LOW POSITION, RAILS UP 
X2, CALL LIGHT ON REACH, WILL CONTINUE MONITORING.